# Patient Record
Sex: FEMALE | Race: WHITE
[De-identification: names, ages, dates, MRNs, and addresses within clinical notes are randomized per-mention and may not be internally consistent; named-entity substitution may affect disease eponyms.]

---

## 2017-11-12 ENCOUNTER — HOSPITAL ENCOUNTER (EMERGENCY)
Dept: HOSPITAL 61 - ER | Age: 82
Discharge: HOME | End: 2017-11-12
Payer: MEDICARE

## 2017-11-12 VITALS — BODY MASS INDEX: 28.32 KG/M2 | WEIGHT: 170 LBS | HEIGHT: 65 IN

## 2017-11-12 VITALS — DIASTOLIC BLOOD PRESSURE: 89 MMHG | SYSTOLIC BLOOD PRESSURE: 131 MMHG

## 2017-11-12 DIAGNOSIS — Z91.040: ICD-10-CM

## 2017-11-12 DIAGNOSIS — M10.9: ICD-10-CM

## 2017-11-12 DIAGNOSIS — I50.9: ICD-10-CM

## 2017-11-12 DIAGNOSIS — R04.0: Primary | ICD-10-CM

## 2017-11-12 DIAGNOSIS — E78.00: ICD-10-CM

## 2017-11-12 DIAGNOSIS — Z88.5: ICD-10-CM

## 2017-11-12 DIAGNOSIS — Z88.0: ICD-10-CM

## 2017-11-12 DIAGNOSIS — M81.0: ICD-10-CM

## 2017-11-12 DIAGNOSIS — I11.0: ICD-10-CM

## 2017-11-12 PROCEDURE — 30901 CONTROL OF NOSEBLEED: CPT

## 2017-11-12 NOTE — PHYS DOC
Past Medical History


Past Medical History:  Cancer, CHF, High Cholesterol, Hypertension, Other


Additional Past Medical Histor:  GOUT, OSTEOPOROSIS, R BREAST CA


Past Surgical History:  Appendectomy, Cancer Surgery, Cholecystectomy, Coronary 

Bypass Surgery, Hysterectomy, Other


Additional Past Surgical Histo:  RIGHT MASTECTOMY, VAGINAL-ANAL FISTULA REPAIR


Alcohol Use:  None


Drug Use:  None





Adult General


Chief Complaint


Chief Complaint:  NOSEBLEED





HPI


HPI





Patient is a 82  year old female brought to the ED by a family member with the 

complaint of nosebleed for about 1-2 hours. Patient does not know what started 

her nosebleed. She has had a few in the past but has never had a significant 

problem with nosebleeds. Patient takes low-dose aspirin daily and also another 

blood thinner which she believes his Plavix. Patient does not think she lost a 

lot of blood.





Prior to this, the patient has been feeling fine without recent chest pain, 

weakness, GI symptoms.





PCP Dr. Myers





Review of Systems


Review of Systems





Constitutional: Denies fever or chills []


HENT: As in history of present illness


Respiratory: Denies cough or shortness of breath []


Cardiovascular: Denies chest pain





All other systems were reviewed and found to be within normal limits, except as 

documented in this note.





Current Medications


Current Medications





Current Medications








 Medications


  (Trade)  Dose


 Ordered  Sig/Qing  Start Time


 Stop Time Status Last Admin


Dose Admin


 


 Lidocaine HCl


  (Glydo


  (Lidocaine) Jelly)  1 avelina  1X  ONCE  11/12/17 15:15


 11/12/17 15:16 DC 11/12/17 15:15


1 AVELINA


 


 Oxymetazoline HCl


  (Afrin)  2 spray  1X  ONCE  11/12/17 15:15


 11/12/17 15:16 DC 11/12/17 15:15


2 SPRAY











Allergies


Allergies





Allergies








Coded Allergies Type Severity Reaction Last Updated Verified


 


  Penicillins Allergy Intermediate  5/2/16 Yes


 


  codeine Allergy Intermediate Rash 6/3/16 Yes


 


  latex Allergy Intermediate  5/2/16 Yes











Physical Exam


Physical Exam





Constitutional: Well developed, well nourished, no acute distress, non-toxic 

appearance. Alert, mentating normally, vital signs stable, warm and dry.


HENT: Normocephalic, atraumatic, bilateral external ears normal, oropharynx 

moist, no oral exudates, no active bleeding from the nose. She has been holding 

pressure on the left side of her nose. Inspection of the nose: No active 

bleeding. Swelling and erythema of the anterior septum.


Eyes:  conjunctiva normal, no discharge. [] 


Neck: Normal range of motion,  no stridor. [] 


Skin: Warm, dry, no erythema, no rash. [] 


Extremities: No tenderness, no cyanosis, no clubbing, ROM intact, no edema. [] 


Neurologic: Alert and oriented X 3, normal motor function,  no focal deficits 

noted. []





Current Patient Data


Vital Signs





 Vital Signs








  Date Time  Temp Pulse Resp B/P (MAP) Pulse Ox O2 Delivery O2 Flow Rate FiO2


 


11/12/17 14:50 97.5 57 20  97 Room Air  





 97.5       











EKG


EKG


[]





Radiology/Procedures


Radiology/Procedures


Procedure:


Epistaxis management by me


Epistaxis from the left Nare


Afrin spray was instilled


A anterior Rhino Rocket was soaked in warm water, lidocaine jelly was used, the 

Rhino Rocket was placed. Because of the patient's anatomy, the Rhino Rocket was 

not able to be advanced all the way they probably got 80% into the nare


It was gently inflated with 3 mL's, patient tolerated that well.


Patient was observed for an additional 30 minutes and did not have any 

recurrence of bleeding. She ambulated to the bathroom with assistance and was 

stable with ambulation. Vital signs remained stable.


[]





Course & Med Decision Making


Course & Med Decision Making


Pertinent Labs and Imaging studies reviewed. (See chart for details)





82-year-old female who takes low-dose aspirin and Plavix presents with a 

nosebleed that, although not severe, has been intermittent for more than an 

hour. Although the site of bleeding was not identified on physical exam, 

believe it was an anterior bleed, by history. Rapid Rhino was placed and 

bleeding remained controlled. Patient is stable for discharge. See instructions 

for plan.





[]





Dragon Disclaimer


Dragon Disclaimer


This electronic medical record was generated, in whole or in part, using a 

voice recognition dictation system.





Departure


Departure


Impression:  


 Primary Impression:  


 Left-sided nosebleed


Disposition:  01 HOME, SELF-CARE


Condition:  IMPROVED


Referrals:  


SIOMARA MYERS MD (PCP)








LAMIN MACHUCA MD


Patient Instructions:  Nosebleed, Easy-to-Read





Additional Instructions:  


Today, we put a "balloon" into your nose to apply pressure from the inside. Try 

to avoid blowing your nose or sneezing to avoid dislodging the balloon.





As we discussed, you may have some blood or clots of blood in the back of your 

throat that is coming from blood that collected in your sinuses while your nose 

was bleeding. If you believe you are having active bleeding, return to 

emergency.





We will put you on an antibiotic to try to prevent a sinus infection. Take all 

of your medications as prescribed, including aspirin and Plavix.





Call tomorrow to make an appointment with ENT doctor. You may ask your primary 

care doctor for a referral, or I gave you the name and phone number of the ENT 

doctor here at [place]. The ENT doctor may not want to see you for a few 

days.


Scripts


Azithromycin (AZITHROMYCIN TABLET) 250 Mg Tablet


1 PKG PO UD for prevent sinus infection, #6 TAB


   Prov: MARYANNE FRAIRE MD         11/12/17











MARYANNE FRAIRE MD Nov 12, 2017 16:03

## 2019-06-12 ENCOUNTER — HOSPITAL ENCOUNTER (EMERGENCY)
Dept: HOSPITAL 61 - ER | Age: 84
Discharge: HOME | End: 2019-06-12
Payer: MEDICARE

## 2019-06-12 VITALS — SYSTOLIC BLOOD PRESSURE: 146 MMHG | DIASTOLIC BLOOD PRESSURE: 67 MMHG

## 2019-06-12 VITALS — HEIGHT: 65 IN | WEIGHT: 162 LBS | BODY MASS INDEX: 26.99 KG/M2

## 2019-06-12 DIAGNOSIS — M10.9: ICD-10-CM

## 2019-06-12 DIAGNOSIS — S80.12XA: Primary | ICD-10-CM

## 2019-06-12 DIAGNOSIS — Z90.89: ICD-10-CM
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
## 2019-06-12 DIAGNOSIS — Z88.0: ICD-10-CM

## 2019-06-12 DIAGNOSIS — I13.0: ICD-10-CM
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
## 2019-06-12 DIAGNOSIS — N18.9: ICD-10-CM

## 2019-06-12 DIAGNOSIS — E78.00: ICD-10-CM

## 2019-06-12 DIAGNOSIS — Z88.5: ICD-10-CM

## 2019-06-12 DIAGNOSIS — Z90.49: ICD-10-CM

## 2019-06-12 DIAGNOSIS — Z91.040: ICD-10-CM
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
## 2019-06-12 DIAGNOSIS — Z95.1: ICD-10-CM

## 2019-06-12 DIAGNOSIS — I50.9: ICD-10-CM

## 2019-06-12 LAB
ALBUMIN SERPL-MCNC: 3.5 G/DL (ref 3.4–5)
ALBUMIN/GLOB SERPL: 0.9 {RATIO} (ref 1–1.7)
ALP SERPL-CCNC: 83 U/L (ref 46–116)
ALT SERPL-CCNC: 15 U/L (ref 14–59)
ANION GAP SERPL CALC-SCNC: 11 MMOL/L (ref 6–14)
APTT BLD: 28 SEC (ref 24–38)
AST SERPL-CCNC: 15 U/L (ref 15–37)
BASOPHILS # BLD AUTO: 0 X10^3/UL (ref 0–0.2)
BASOPHILS NFR BLD: 1 % (ref 0–3)
BILIRUB SERPL-MCNC: 0.2 MG/DL (ref 0.2–1)
BUN SERPL-MCNC: 68 MG/DL (ref 7–20)
BUN/CREAT SERPL: 18 (ref 6–20)
CALCIUM SERPL-MCNC: 11.7 MG/DL (ref 8.5–10.1)
CHLORIDE SERPL-SCNC: 99 MMOL/L (ref 98–107)
CO2 SERPL-SCNC: 27 MMOL/L (ref 21–32)
CREAT SERPL-MCNC: 3.8 MG/DL (ref 0.6–1)
EOSINOPHIL NFR BLD: 0.3 X10^3/UL (ref 0–0.7)
EOSINOPHIL NFR BLD: 4 % (ref 0–3)
ERYTHROCYTE [DISTWIDTH] IN BLOOD BY AUTOMATED COUNT: 14.8 % (ref 11.5–14.5)
GFR SERPLBLD BASED ON 1.73 SQ M-ARVRAT: 11.3 ML/MIN
GLOBULIN SER-MCNC: 4 G/DL (ref 2.2–3.8)
GLUCOSE SERPL-MCNC: 102 MG/DL (ref 70–99)
HCT VFR BLD CALC: 28 % (ref 36–47)
HGB BLD-MCNC: 9.1 G/DL (ref 12–15.5)
LYMPHOCYTES # BLD: 0.9 X10^3/UL (ref 1–4.8)
LYMPHOCYTES NFR BLD AUTO: 14 % (ref 24–48)
MCH RBC QN AUTO: 32 PG (ref 25–35)
MCHC RBC AUTO-ENTMCNC: 33 G/DL (ref 31–37)
MCV RBC AUTO: 99 FL (ref 79–100)
MONO #: 0.6 X10^3/UL (ref 0–1.1)
MONOCYTES NFR BLD: 10 % (ref 0–9)
NEUT #: 4.8 X10^3UL (ref 1.8–7.7)
NEUTROPHILS NFR BLD AUTO: 72 % (ref 31–73)
PLATELET # BLD AUTO: 280 X10^3/UL (ref 140–400)
POTASSIUM SERPL-SCNC: 5 MMOL/L (ref 3.5–5.1)
PROT SERPL-MCNC: 7.5 G/DL (ref 6.4–8.2)
PROTHROMBIN TIME: 13.1 SEC (ref 11.7–14)
RBC # BLD AUTO: 2.82 X10^6/UL (ref 3.5–5.4)
SODIUM SERPL-SCNC: 137 MMOL/L (ref 136–145)
WBC # BLD AUTO: 6.7 X10^3/UL (ref 4–11)

## 2019-06-12 PROCEDURE — 83880 ASSAY OF NATRIURETIC PEPTIDE: CPT

## 2019-06-12 PROCEDURE — 93971 EXTREMITY STUDY: CPT

## 2019-06-12 PROCEDURE — 85025 COMPLETE CBC W/AUTO DIFF WBC: CPT

## 2019-06-12 PROCEDURE — 85730 THROMBOPLASTIN TIME PARTIAL: CPT

## 2019-06-12 PROCEDURE — 36415 COLL VENOUS BLD VENIPUNCTURE: CPT

## 2019-06-12 PROCEDURE — 80053 COMPREHEN METABOLIC PANEL: CPT

## 2019-06-12 PROCEDURE — 85610 PROTHROMBIN TIME: CPT

## 2019-06-12 NOTE — PHYS DOC
Past Medical History


Past Medical History:  Cancer, CHF, High Cholesterol, Hypertension, Other


Additional Past Medical Histor:  GOUT, OSTEOPOROSIS, R BREAST CA


Past Surgical History:  Appendectomy, Cancer Surgery, Cholecystectomy, Coronary 

Bypass Surgery, Hysterectomy, Other


Additional Past Surgical Histo:  R MASTECTOMY, VAGINAL-ANAL FISTULA REPAIR


Alcohol Use:  None


Drug Use:  None





Adult General


Chief Complaint


Chief Complaint:  LOWER EXT PAIN





[facility]


HPI





Patient is a 84  year old female who presents with complaining of left lower 

extremity pain. Patient complaining of left lower extremity ecchymoses and 

change of color to more than one week and content episode of pain that last for 

a few seconds. Patient denies injury and she has a blood clot to her leg. 

Patient currently taking aspirin and Plavix.





Review of Systems


Review of Systems





Constitutional: Denies fever or chills []


Eyes: Denies change in visual acuity, redness, or eye pain []


HENT: Denies nasal congestion or sore throat []


Respiratory: Denies cough or shortness of breath []


Cardiovascular: No additional information not addressed in HPI []


GI: Denies abdominal pain, nausea, vomiting, bloody stools or diarrhea []


: Denies dysuria or hematuria []


Musculoskeletal: Denies back pain, reports joint pain []


Integument: Denies rash or skin lesions []


Neurologic: Denies headache, focal weakness or sensory changes []


Endocrine: Denies polyuria or polydipsia []





All other systems were reviewed and found to be within normal limits, except as 

documented in this note.





Current Medications


Current Medications





Current Medications








 Medications


  (Trade)  Dose


 Ordered  Sig/[facility]  Start Time


 Stop Time Status Last Admin


Dose Admin


 


 Tramadol HCl


  (Ultram)  50 mg  1X  ONCE  19 16:00


 19 16:01 DC 19 16:06


50 MG











Allergies


Allergies





Allergies








Coded Allergies Type Severity Reaction Last Updated Verified


 


  Penicillins Allergy Intermediate  16 Yes


 


  codeine Allergy Intermediate Rash 6/3/16 Yes


 


  latex Allergy Intermediate  16 Yes











Physical Exam


Physical Exam





Constitutional: Well developed, well nourished, no acute distress, non-toxic 

appearance. []


HENT: Normocephalic, atraumatic, bilateral external ears normal, oropharynx 

moist, no oral exudates, nose normal. []


Eyes: PERRLA, EOMI, conjunctiva normal, no discharge. [] 


Neck: Normal range of motion, no tenderness, supple, no stridor. [] 


Cardiovascular:Heart rate regular rhythm, no murmur []


Lungs & Thorax:  Bilateral breath sounds clear to auscultation []


Abdomen: Bowel sounds normal, soft, no tenderness, no masses, no pulsatile 

masses. [] 


Skin: Warm, dry, no erythema, no rash. [] 


Back: No tenderness, no CVA tenderness. [] 


Extremities: No tenderness, no cyanosis, no clubbing, ROM intact, no edema. [] 


Neurologic: Alert and oriented X 3, normal motor function, normal sensory 

function, no focal deficits noted. []


Psychologic: Affect normal, judgement normal, mood normal. []





Current Patient Data


Vital Signs





                                   Vital Signs








  Date Time  Temp Pulse Resp B/P (MAP) Pulse Ox O2 Delivery O2 Flow Rate FiO2


 


19 16:06   17     


 


19 15:52  69  146/67 (93) 94 Room Air  


 


19 13:05 98.3       





 98.3       








Lab Values





                                Laboratory Tests








Test


 19


14:20


 


White Blood Count


 6.7 x10^3/uL


(4.0-11.0)


 


Red Blood Count


 2.82 x10^6/uL


(3.50-5.40)  L


 


Hemoglobin


 9.1 g/dL


(12.0-15.5)  L


 


Hematocrit


 28.0 %


(36.0-47.0)  L


 


Mean Corpuscular Volume


 99 fL ()





 


Mean Corpuscular Hemoglobin 32 pg (25-35)  


 


Mean Corpuscular Hemoglobin


Concent 33 g/dL


(31-37)


 


Red Cell Distribution Width


 14.8 %


(11.5-14.5)  H


 


Platelet Count


 280 x10^3/uL


(140-400)


 


Neutrophils (%) (Auto) 72 % (31-73)  


 


Lymphocytes (%) (Auto) 14 % (24-48)  L


 


Monocytes (%) (Auto) 10 % (0-9)  H


 


Eosinophils (%) (Auto) 4 % (0-3)  H


 


Basophils (%) (Auto) 1 % (0-3)  


 


Neutrophils # (Auto)


 4.8 x10^3uL


(1.8-7.7)


 


Lymphocytes # (Auto)


 0.9 x10^3/uL


(1.0-4.8)  L


 


Monocytes # (Auto)


 0.6 x10^3/uL


(0.0-1.1)


 


Eosinophils # (Auto)


 0.3 x10^3/uL


(0.0-0.7)


 


Basophils # (Auto)


 0.0 x10^3/uL


(0.0-0.2)


 


Prothrombin Time


 13.1 SEC


(11.7-14.0)


 


Prothrombin Time INR 1.0 (0.8-1.1)  


 


PTT


 28 SEC (24-38)





 


Sodium Level


 137 mmol/L


(136-145)


 


Potassium Level


 5.0 mmol/L


(3.5-5.1)


 


Chloride Level


 99 mmol/L


()


 


Carbon Dioxide Level


 27 mmol/L


(21-32)


 


Anion Gap 11 (6-14)  


 


Blood Urea Nitrogen


 68 mg/dL


(7-20)  H


 


Creatinine


 3.8 mg/dL


(0.6-1.0)  H


 


Estimated GFR


(Cockcroft-Gault) 11.3  





 


BUN/Creatinine Ratio 18 (6-20)  


 


Glucose Level


 102 mg/dL


(70-99)  H


 


Calcium Level


 11.7 mg/dL


(8.5-10.1)  H


 


Total Bilirubin


 0.2 mg/dL


(0.2-1.0)


 


Aspartate Amino Transferase


(AST) 15 U/L (15-37)





 


Alanine Aminotransferase (ALT)


 15 U/L (14-59)





 


Alkaline Phosphatase


 83 U/L


()


 


NT-Pro-B-Type Natriuretic


Peptide 728 pg/mL


(0-449)  H


 


Total Protein


 7.5 g/dL


(6.4-8.2)


 


Albumin


 3.5 g/dL


(3.4-5.0)


 


Albumin/Globulin Ratio


 0.9 (1.0-1.7)


L





                                Laboratory Tests


19 14:20








                                Laboratory Tests


19 14:20














EKG


EKG


[]





Radiology/Procedures


Radiology/Procedures


[facility]


                    [address] [place] [address] [phone]


                                        


                                 IMAGING REPORT





                                     Signed





PATIENT: MARIE MARS   ACCOUNT: [id number]     MRN#: [id number]


: 1935           LOCATION: ER              AGE: 84


SEX: F                    EXAM DT: 19         ACCESSION#: [id number]


STATUS: REG ER            ORD. PHYSICIAN: TIFFANY RODRIGEZ MD   


REASON: pain


PROCEDURE: VENOUS LOWER EXTREMITY LEFT





 


Left lower extremity venous doppler ultrasound 


 


History:  Left leg pain


 


Comparison:  None


 


Findings:  Multiple grayscale, color, and duplex spectral analysis 


sonographic images were acquired of the left lower extremity veins to 


evaluate for the presence of DVT. There is normal phasicity.  Normal 


compression, color-flow, and augmentation is demonstrated from the left 


common femoral to the popliteal veins.  There is normal color flow of the 


proximal greater saphenous and profunda femoris veins. There is normal 


color flow of segments of the calf veins. There is a left popliteal fossa 


fluid collection about 4.7 x 2.8 x 1.3 cm. There is also small fluid 


collection in the lateral to anterior calf region about 2.2 x 1.4 x 0.4 cm


in size and appearance corresponds with site of visible bruising.


 


Impression: 


 


1. There is no evidence of deep venous thrombosis from the left common 


femoral to the popliteal veins.


2. There is left popliteal fossa fluid collection. There is also small 


fluid collection of the left calf which may be a hematoma.


 


Electronically signed by: Daysi Montemayor MD (2019 1:57 PM) [email or web address]














DICTATED and SIGNED BY:     DAYSI MONTEMAYOR MD


DATE:     19 3946





Course & Med Decision Making


Course & Med Decision Making


Pertinent Labs and Imaging studies reviewed. (See chart for details)





Evaluation of patient in ER showed 84-year-old female patient with complaining 

of left lower extremity pain with old ecchymosis without known injury. Patient 

currently taking aspirin and Plavix. Patient had unremarkable ultrasound of her 

leg and felt better with tramadol. Ace wrap was applied and was advised to 

follow up with her primary care physician.





Dragon Disclaimer


Dragon Disclaimer


This electronic medical record was generated, in whole or in part, using a voice

 recognition dictation system.





Departure


Departure


Impression:  


   Primary Impression:  


   Contusion of left lower extremity


   Additional Impressions:  


   Chronic renal insufficiency


   Anemia


   Cabrera cyst


Disposition:   HOME, SELF-CARE (at 1623)


Condition:  IMPROVED


Referrals:  


SIOMARA MYERS MD (PCP)


Patient Instructions:  Anemia, FAQs, Baker's Cyst, Chronic Renal Insufficiency, 

Contusion





Additional Instructions:  





Follow-up with your primary care physician in 3-5 days


Return to ER if not getting better


Scripts


Tramadol Hcl (ULTRAM) 50 Mg Tablet


50 MG PO Q6HRS PRN for PAIN, #14 TAB 0 Refills


   Prov: TIFFANY RODRIGEZ MD         19





Problem Qualifiers








   Primary Impression:  


   Contusion of left lower extremity


   Encounter type:  initial encounter  Qualified Codes:  S80.12XA - Contusion of

    left lower leg, initial encounter


   Additional Impressions:  


   Chronic renal insufficiency


   Chronic kidney disease stage:  unspecified stage  Qualified Codes:  N18.9 - 

   Chronic kidney disease, unspecified


   Anemia


   Anemia type:  unspecified type  Qualified Codes:  D64.9 - Anemia, unspecified


   Baker cyst


   Laterality:  left  Qualified Codes:  M71.22 - Synovial cyst of popliteal 

   space [Baker], left knee








TIFFANY RODRIGEZ MD             2019 16:25

## 2019-06-12 NOTE — RAD
Left lower extremity venous doppler ultrasound 

 

History:  Left leg pain

 

Comparison:  None

 

Findings:  Multiple grayscale, color, and duplex spectral analysis 

sonographic images were acquired of the left lower extremity veins to 

evaluate for the presence of DVT. There is normal phasicity.  Normal 

compression, color-flow, and augmentation is demonstrated from the left 

common femoral to the popliteal veins.  There is normal color flow of the 

proximal greater saphenous and profunda femoris veins. There is normal 

color flow of segments of the calf veins. There is a left popliteal fossa 

fluid collection about 4.7 x 2.8 x 1.3 cm. There is also small fluid 

collection in the lateral to anterior calf region about 2.2 x 1.4 x 0.4 cm

in size and appearance corresponds with site of visible bruising.

 

Impression: 

 

1. There is no evidence of deep venous thrombosis from the left common 

femoral to the popliteal veins.

2. There is left popliteal fossa fluid collection. There is also small 

fluid collection of the left calf which may be a hematoma.

 

Electronically signed by: Mohsen Montemayor MD (6/12/2019 1:57 PM) [facility]